# Patient Record
(demographics unavailable — no encounter records)

---

## 2024-10-21 NOTE — REVIEW OF SYSTEMS
[Negative] : Musculoskeletal IMPROVE VTE Individual Risk Assessment    RISK                                                                Points    [  ] Previous VTE                                                  3    [  ] Thrombophilia                                               2    [  ] Lower limb paralysis                                      2        (unable to hold up >15 seconds)    [  ] Current Cancer                                              2         (within 6 months)  [X] Immobilization > 24 hrs                                1  [  ] ICU/CCU stay > 24 hours                              1  [X] Age > 60                                                      1    IMPROVE VTE Score _____2____  c/w Lovenox 40 mg qd

## 2024-10-23 NOTE — PHYSICAL EXAM
[Chaperone Present] : A chaperone was present in the examining room during all aspects of the physical examination [78023] : A chaperone was present during the pelvic exam. [FreeTextEntry2] : Flori Swan MA was present the entire duration of the patient interaction and gynecological exam. [Absent] : Adnexa(ae): Absent [Normal] : Anus and perineum: Normal sphincter tone, no masses, no prolapse. [de-identified] : soft, non-tender [de-identified] : + sebaceous cysts with sebum ready to burst (right labia), perianal on the right [de-identified] : no discharge or lesions [de-identified] : no nodularity or masses [Restricted in physically strenuous activity but ambulatory and able to carry out work of a light or sedentary nature] : Status 1- Restricted in physically strenuous activity but ambulatory and able to carry out work of a light or sedentary nature, e.g., light house work, office work

## 2024-10-23 NOTE — REASON FOR VISIT
[FreeTextEntry1] : [facility] [place]. [phone] [address] [place] [address]

## 2024-10-23 NOTE — REASON FOR VISIT
[FreeTextEntry1] : [facility] [place]. [phone] [address] [place] [address]

## 2024-10-23 NOTE — ASSESSMENT
[FreeTextEntry1] : This 72 y/o patient with a hx of Stage IA grade 1 endometrial cancer since 9/2018 s/p RA TLH RSO, cysto, also s/p CO2 laser ablation of VAIN on 3/1/22 for low grade vaginal dysplasia.   No evidence of endometrial cancer recurrence, no concern for vaginal dysplasia or vulvar. Sebaceous cysts on vulva/perineum which I recommend sitz baths and squeezing them to release the mucus. No concern for dysplasia or vulvar cancer.  Patient is now 5 years since endometrial cancer, no concern for recurrence on physical exam and PET T from 10/2/24 negative for concern. Recommend routine gynecologic care and she does not need additional surveillance visits. Her gynecologist had a heart attack and no longer practicing, so she would like to continue to come out our office and see PA for routine gynecologic care. Recommend yearly pelvic exams, skin exams for evidence of dysplasia. Mammograms yearly for screening. Colonoscopy as needed, bone density and vaccines to be discussed.

## 2024-10-23 NOTE — PHYSICAL EXAM
[Chaperone Present] : A chaperone was present in the examining room during all aspects of the physical examination [48307] : A chaperone was present during the pelvic exam. [FreeTextEntry2] : Flori Swan MA was present the entire duration of the patient interaction and gynecological exam. [Absent] : Adnexa(ae): Absent [Normal] : Anus and perineum: Normal sphincter tone, no masses, no prolapse. [de-identified] : soft, non-tender [de-identified] : + sebaceous cysts with sebum ready to burst (right labia), perianal on the right [de-identified] : no discharge or lesions [de-identified] : no nodularity or masses [Restricted in physically strenuous activity but ambulatory and able to carry out work of a light or sedentary nature] : Status 1- Restricted in physically strenuous activity but ambulatory and able to carry out work of a light or sedentary nature, e.g., light house work, office work

## 2024-10-23 NOTE — HISTORY OF PRESENT ILLNESS
[FreeTextEntry1] : This 70 y/o patient with a hx of Stage IA grade 1 endometrial cancer since 9/2018 s/p RA TLH RSO, cysto, also s/p CO2 laser ablation of VAIN on 3/1/22 for low grade vaginal dysplasia. Patient was last seen October 2023 and we had discussed that it had been 5 years since cancer so likely she is cured at this point. Recommended a repeat exam one more time.  She presents to the office today for a 1 year svl visit. Patient with lung cancer and continues to smoke. New lesion seen in the lung so she will need follow up to see if there is increase in size on and will need additional treatment. No evidence of endometrial cancer recurrence, no new symptoms.   Patient had a PET/CT 10/2/24: Impression: No suspicious areas of increased glucose uptake seen.  Post surgical changes involving the right upper lobe with a vague 7 mm, right upper lobe nodule which is minimally more prominent but not glucose avid.  Colonic diverticulosis  Spinal degenerative changes.

## 2024-10-23 NOTE — HISTORY OF PRESENT ILLNESS
[FreeTextEntry1] : This 72 y/o patient with a hx of Stage IA grade 1 endometrial cancer since 9/2018 s/p RA TLH RSO, cysto, also s/p CO2 laser ablation of VAIN on 3/1/22 for low grade vaginal dysplasia. Patient was last seen October 2023 and we had discussed that it had been 5 years since cancer so likely she is cured at this point. Recommended a repeat exam one more time.  She presents to the office today for a 1 year svl visit. Patient with lung cancer and continues to smoke. New lesion seen in the lung so she will need follow up to see if there is increase in size on and will need additional treatment. No evidence of endometrial cancer recurrence, no new symptoms.   Patient had a PET/CT 10/2/24: Impression: No suspicious areas of increased glucose uptake seen.  Post surgical changes involving the right upper lobe with a vague 7 mm, right upper lobe nodule which is minimally more prominent but not glucose avid.  Colonic diverticulosis  Spinal degenerative changes.

## 2024-10-23 NOTE — END OF VISIT
[FreeTextEntry3] : RTC in 1 year with PA for routine gynecologic and surveillance visits Follow up with her oncologist for monitoring of lung lesions [FreeTextEntry2] : Flori Swan MA was present the entire duration of the patient interaction and gynecological exam.

## 2025-01-17 NOTE — PHYSICAL EXAM
[Neck Appearance] : the appearance of the neck was normal [] : no respiratory distress [Respiration, Rhythm And Depth] : normal respiratory rhythm and effort [Examination Of The Chest] : the chest was normal in appearance [Chest Visual Inspection Thoracic Asymmetry] : no chest asymmetry [Bowel Sounds] : normal bowel sounds [Abdomen Soft] : soft [Abdomen Tenderness] : non-tender [Cervical Lymph Nodes Enlarged Posterior Bilaterally] : posterior cervical [Cervical Lymph Nodes Enlarged Anterior Bilaterally] : anterior cervical [Supraclavicular Lymph Nodes Enlarged Bilaterally] : supraclavicular [Oriented To Time, Place, And Person] : oriented to person, place, and time

## 2025-01-17 NOTE — HISTORY OF PRESENT ILLNESS
[FreeTextEntry1] : RUKHSANA LINARES is 71 years old female, who is status post Flex Bronch right robot assisted upper lobe wedge resection with mediastinal lymph node dissection on 10/25/23. Pathology was positive for Stage I adenocarcinoma. She presents today to review and discuss surveillance CT Chest results.   8/28/24 CT Chest at [facility]  - 6.2 mm lateral RUL nodule slightly increased in size compared to prior - 9.6 mm RUL cyst stable - other multiple stable small nodules - 1.1 subcarinal LN stable and adjacent smaller LN 1.0  1/14/25 CT Chest performed at [facility]  -6 x 4.1 mm right upper lobe nodule unchanged -9.1 x 5.4 mm cavitary lesion left upper lobe unchanged -There is a 6.6 x 4.5 mm anterior right upper lobe nodule  -New 2.5 x 2.5 mm right upper lobe nodule -New 4.4 x 4.4mm right upper lobe nodule -Cluster of nodular densities identified anteriorly within the right upper lobe most likely inflammatory in origin which have somewhat of a tree in bud type configuration -There are 3.6 x 4mm medial right middle lobe nodules -Cluster of nodules identified medially within the right lower lobe which may be inflammatory or neoplastic in origin

## 2025-01-17 NOTE — ASSESSMENT
[FreeTextEntry1] : Ms. Prater's most recent cat scan chest shows no change in the previously seen lung nodules.  I am recommending a suirveillance cat scan chest in three months to ensure stability of the nodules.

## 2025-01-17 NOTE — HISTORY OF PRESENT ILLNESS
[FreeTextEntry1] : RUKHSANA LINARES is 71 years old female, who is status post Flex Bronch right robot assisted upper lobe wedge resection with mediastinal lymph node dissection on 10/25/23. Pathology was positive for Stage I adenocarcinoma. She presents today to review and discuss surveillance CT Chest results.   8/28/24 CT Chest at [facility]  - 6.2 mm lateral RUL nodule slightly increased in size compared to prior - 9.6 mm RUL cyst stable - other multiple stable small nodules - 1.1 subcarinal LN stable and adjacent smaller LN 1.0  1/14/25 CT Chest performed at [facility]  -6 x 4.1 mm right upper lobe nodule unchanged -9.1 x 5.4 mm cavitary lesion left upper lobe unchanged -There is a 6.6 x 4.5 mm anterior right upper lobe nodule  -New 2.5 x 2.5 mm right upper lobe nodule -New 4.4 x 4.4mm right upper lobe nodule -Cluster of nodular densities identified anteriorly within the right upper lobe most likely inflammatory in origin which have somewhat of a tree in bud type configuration -There are 3.6 x 4mm medial right middle lobe nodules -Cluster of nodules identified medially within the right lower lobe which may be inflammatory or neoplastic in origin

## 2025-01-17 NOTE — HISTORY OF PRESENT ILLNESS
[FreeTextEntry1] : RUKHSANA LINARES is 71 years old female, who is status post Flex Bronch right robot assisted upper lobe wedge resection with mediastinal lymph node dissection on 10/25/23. Pathology was positive for Stage I adenocarcinoma. She presents today to review and discuss surveillance CT Chest results.   8/28/24 CT Chest at [facility]  - 6.2 mm lateral RUL nodule slightly increased in size compared to prior - 9.6 mm RUL cyst stable - other multiple stable small nodules - 1.1 subcarinal LN stable and adjacent smaller LN 1.0  1/14/25 CT Chest performed at [facility]  -6 x 4.1 mm right upper lobe nodule unchanged -9.1 x 5.4 mm cavitary lesion left upper lobe unchanged -There is a 6.6 x 4.5 mm anterior right upper lobe nodule  -New 2.5 x 2.5 mm right upper lobe nodule -New 4.4 x 4.4mm right upper lobe nodule -Cluster of nodular densities identified anteriorly within the right upper lobe most likely inflammatory in origin which have somewhat of a tree in bud type configuration -There are 3.6 x 4mm medial right middle lobe nodules -Cluster of nodules identified medially within the right lower lobe which may be inflammatory or neoplastic in origin  Consent (Lip)/Introductory Paragraph: The rationale for Mohs was explained to the patient and consent was obtained. The risks, benefits and alternatives to therapy were discussed in detail. Specifically, the risks of lip deformity, changes in the oral aperture, infection, scarring, bleeding, prolonged wound healing, incomplete removal, allergy to anesthesia, nerve injury and recurrence were addressed. Prior to the procedure, the treatment site was clearly identified and confirmed by the patient. All components of Universal Protocol/PAUSE Rule completed.

## 2025-03-13 NOTE — DISCUSSION/SUMMARY
[FreeTextEntry1] : She had a normal echo.  a coronary CTA showed a calcium score of 125 with minor plaque in her coronary arteries. f/u in 8 months [EKG obtained to assist in diagnosis and management of assessed problem(s)] : EKG obtained to assist in diagnosis and management of assessed problem(s)

## 2025-03-13 NOTE — PHYSICAL EXAM
[General Appearance - Well Developed] : well developed [Normal Appearance] : normal appearance [Well Groomed] : well groomed [General Appearance - Well Nourished] : well nourished [No Deformities] : no deformities [General Appearance - In No Acute Distress] : no acute distress [Normal Conjunctiva] : the conjunctiva exhibited no abnormalities [Eyelids - No Xanthelasma] : the eyelids demonstrated no xanthelasmas [Normal Oral Mucosa] : normal oral mucosa [No Oral Pallor] : no oral pallor [No Oral Cyanosis] : no oral cyanosis [Normal Jugular Venous A Waves Present] : normal jugular venous A waves present [Normal Jugular Venous V Waves Present] : normal jugular venous V waves present [No Jugular Venous Lackey A Waves] : no jugular venous lackey A waves [Respiration, Rhythm And Depth] : normal respiratory rhythm and effort [Exaggerated Use Of Accessory Muscles For Inspiration] : no accessory muscle use [Auscultation Breath Sounds / Voice Sounds] : lungs were clear to auscultation bilaterally [Heart Rate And Rhythm] : heart rate and rhythm were normal [Heart Sounds] : normal S1 and S2 [Murmurs] : no murmurs present [Abdomen Soft] : soft [Abdomen Tenderness] : non-tender [Abdomen Mass (___ Cm)] : no abdominal mass palpated [Abnormal Walk] : normal gait [Gait - Sufficient For Exercise Testing] : the gait was sufficient for exercise testing [Nail Clubbing] : no clubbing of the fingernails [Cyanosis, Localized] : no localized cyanosis [Petechial Hemorrhages (___cm)] : no petechial hemorrhages [Skin Color & Pigmentation] : normal skin color and pigmentation [] : no rash [No Venous Stasis] : no venous stasis [Skin Lesions] : no skin lesions [No Skin Ulcers] : no skin ulcer [No Xanthoma] : no  xanthoma was observed [Oriented To Time, Place, And Person] : oriented to person, place, and time [Affect] : the affect was normal [Mood] : the mood was normal [No Anxiety] : not feeling anxious

## 2025-03-13 NOTE — REASON FOR VISIT
[Follow-Up - Clinic] : a clinic follow-up of [Dizziness] : dizziness [FreeTextEntry1] : I saw this 71-year-old woman in f/u cardiac consultation on 03/13/25 She continues to smoke cigarettes, stopped the alcohol. has no past medical history and no risk factors for coronary disease other than the cigarettes. She has no complaints  she had a coronary CTA which revealed mild calcium and minor plaque. Was found with adeno CA and endoscopically removed. No further treatment

## 2025-04-18 NOTE — DATA REVIEWED
[FreeTextEntry1] : Independent review of imaging and independent interpretation was performed at today's visit. 04/03/25: CT of the chest from [facility]

## 2025-04-18 NOTE — ASSESSMENT
[FreeTextEntry1] : RUKHSANA LINARES is 71 years old female, who is status post Flex Bronch right robot assisted upper lobe wedge resection with mediastinal lymph node dissection on 10/25/23. Pathology was positive for Stage I adenocarcinoma. She presents today to review and discuss surveillance CT Chest results.  04/03/25: CT of the chest from [facility] - Interval resolution of clustered nodularity and new right upper lobe pulmonary nodule since 1/14/25 which was likely infectious or inflammatory - Stable post surgical changes from partial right upper lobe wedge resection  - Stable right upper lobe pulmonary nodules   I have reviewed the patient's medical records and diagnostic images at time of this office consultation and have made the following recommendation: 1.CT Chest in 6 months   All questions answered. Patient verbalized understanding and will follow up accordingly.

## 2025-04-18 NOTE — PHYSICAL EXAM
[Neck Appearance] : the appearance of the neck was normal [Respiration, Rhythm And Depth] : normal respiratory rhythm and effort [] : no respiratory distress [Examination Of The Chest] : the chest was normal in appearance [Chest Visual Inspection Thoracic Asymmetry] : no chest asymmetry [Bowel Sounds] : normal bowel sounds [Abdomen Soft] : soft [Abdomen Tenderness] : non-tender [Cervical Lymph Nodes Enlarged Posterior Bilaterally] : posterior cervical [Cervical Lymph Nodes Enlarged Anterior Bilaterally] : anterior cervical [Supraclavicular Lymph Nodes Enlarged Bilaterally] : supraclavicular [Oriented To Time, Place, And Person] : oriented to person, place, and time

## 2025-04-18 NOTE — HISTORY OF PRESENT ILLNESS
[FreeTextEntry1] : RUKHSANA LINARES is 71 years old female, who is status post Flex Bronch right robot assisted upper lobe wedge resection with mediastinal lymph node dissection on 10/25/23. Pathology was positive for Stage I adenocarcinoma. She presents today to review and discuss surveillance CT Chest results.  8/28/24 CT Chest at [facility] - 6.2 mm lateral RUL nodule slightly increased in size compared to prior - 9.6 mm RUL cyst stable - other multiple stable small nodules - 1.1 subcarinal LN stable and adjacent smaller LN 1.0  1/14/25 CT Chest performed at [facility] -6 x 4.1 mm right upper lobe nodule unchanged -9.1 x 5.4 mm cavitary lesion left upper lobe unchanged -There is a 6.6 x 4.5 mm anterior right upper lobe nodule -New 2.5 x 2.5 mm right upper lobe nodule -New 4.4 x 4.4mm right upper lobe nodule -Cluster of nodular densities identified anteriorly within the right upper lobe most likely inflammatory in origin which have somewhat of a tree in bud type configuration -There are 3.6 x 4mm medial right middle lobe nodules -Cluster of nodules identified medially within the right lower lobe which may be inflammatory or neoplastic in origin  04/03/25: CT of the chest from [facility] - Interval resolution of clustered nodularity and new right upper lobe pulmonary nodule since 1/14/25 which was likely infectious or inflammatory - Stable post surgical changes from partial right upper lobe wedge resection  - Stable right upper lobe pulmonary nodules

## 2025-04-18 NOTE — DATA REVIEWED
[FreeTextEntry1] : Independent review of imaging and independent interpretation was performed at today's visit. 04/03/25: CT of the chest from [facility]

## 2025-04-18 NOTE — ASSESSMENT
[FreeTextEntry1] : RUKHSANA LINARES is 71 years old female, who is status post Flex Bronch right robot assisted upper lobe wedge resection with mediastinal lymph node dissection on 10/25/23. Pathology was positive for Stage I adenocarcinoma. She presents today to review and discuss surveillance CT Chest results.  04/03/25: CT of the chest from [facility] - Interval resolution of clustered nodularity and new right upper lobe pulmonary nodule since 1/14/25 which was likely infectious or inflammatory - Stable post surgical changes from partial right upper lobe wedge resection  - Stable right upper lobe pulmonary nodules   I have reviewed the patient's medical records and diagnostic images at time of this office consultation and have made the following recommendation: 1.CT Chest in 6 months   All questions answered. Patient verbalized understanding and will follow up accordingly.

## 2025-04-18 NOTE — HISTORY OF PRESENT ILLNESS
[FreeTextEntry1] : RUKHSANA LINARES is 71 years old female, who is status post Flex Bronch right robot assisted upper lobe wedge resection with mediastinal lymph node dissection on 10/25/23. Pathology was positive for Stage I adenocarcinoma. She presents today to review and discuss surveillance CT Chest results.  8/28/24 CT Chest at [facility] - 6.2 mm lateral RUL nodule slightly increased in size compared to prior - 9.6 mm RUL cyst stable - other multiple stable small nodules - 1.1 subcarinal LN stable and adjacent smaller LN 1.0  1/14/25 CT Chest performed at [facility] -6 x 4.1 mm right upper lobe nodule unchanged -9.1 x 5.4 mm cavitary lesion left upper lobe unchanged -There is a 6.6 x 4.5 mm anterior right upper lobe nodule -New 2.5 x 2.5 mm right upper lobe nodule -New 4.4 x 4.4mm right upper lobe nodule -Cluster of nodular densities identified anteriorly within the right upper lobe most likely inflammatory in origin which have somewhat of a tree in bud type configuration -There are 3.6 x 4mm medial right middle lobe nodules -Cluster of nodules identified medially within the right lower lobe which may be inflammatory or neoplastic in origin  04/03/25: CT of the chest from [facility] - Interval resolution of clustered nodularity and new right upper lobe pulmonary nodule since 1/14/25 which was likely infectious or inflammatory - Stable post surgical changes from partial right upper lobe wedge resection  - Stable right upper lobe pulmonary nodules